# Patient Record
Sex: MALE | Race: WHITE | NOT HISPANIC OR LATINO | URBAN - METROPOLITAN AREA
[De-identification: names, ages, dates, MRNs, and addresses within clinical notes are randomized per-mention and may not be internally consistent; named-entity substitution may affect disease eponyms.]

---

## 2017-04-11 ENCOUNTER — OUTPATIENT (OUTPATIENT)
Dept: OUTPATIENT SERVICES | Facility: HOSPITAL | Age: 57
LOS: 1 days | End: 2017-04-11
Payer: COMMERCIAL

## 2017-04-11 ENCOUNTER — APPOINTMENT (OUTPATIENT)
Dept: ULTRASOUND IMAGING | Facility: IMAGING CENTER | Age: 57
End: 2017-04-11

## 2017-04-11 ENCOUNTER — TRANSCRIPTION ENCOUNTER (OUTPATIENT)
Age: 57
End: 2017-04-11

## 2017-04-11 ENCOUNTER — EMERGENCY (EMERGENCY)
Facility: HOSPITAL | Age: 57
LOS: 1 days | Discharge: HOME | End: 2017-04-11
Attending: EMERGENCY MEDICINE | Admitting: EMERGENCY MEDICINE
Payer: COMMERCIAL

## 2017-04-11 VITALS
HEART RATE: 65 BPM | DIASTOLIC BLOOD PRESSURE: 74 MMHG | OXYGEN SATURATION: 99 % | TEMPERATURE: 99 F | SYSTOLIC BLOOD PRESSURE: 130 MMHG | RESPIRATION RATE: 16 BRPM

## 2017-04-11 VITALS
SYSTOLIC BLOOD PRESSURE: 146 MMHG | DIASTOLIC BLOOD PRESSURE: 94 MMHG | OXYGEN SATURATION: 99 % | HEART RATE: 68 BPM | RESPIRATION RATE: 15 BRPM | TEMPERATURE: 98 F

## 2017-04-11 DIAGNOSIS — Z00.8 ENCOUNTER FOR OTHER GENERAL EXAMINATION: ICD-10-CM

## 2017-04-11 LAB
ALBUMIN SERPL ELPH-MCNC: 4.2 G/DL — SIGNIFICANT CHANGE UP (ref 3.3–5)
ALP SERPL-CCNC: 60 U/L — SIGNIFICANT CHANGE UP (ref 40–120)
ALT FLD-CCNC: 21 U/L — SIGNIFICANT CHANGE UP (ref 4–41)
APTT BLD: 37.9 SEC — HIGH (ref 27.5–37.4)
AST SERPL-CCNC: 20 U/L — SIGNIFICANT CHANGE UP (ref 4–40)
BASOPHILS # BLD AUTO: 0.1 K/UL — SIGNIFICANT CHANGE UP (ref 0–0.2)
BASOPHILS NFR BLD AUTO: 1.3 % — SIGNIFICANT CHANGE UP (ref 0–2)
BILIRUB SERPL-MCNC: 0.5 MG/DL — SIGNIFICANT CHANGE UP (ref 0.2–1.2)
BUN SERPL-MCNC: 14 MG/DL — SIGNIFICANT CHANGE UP (ref 7–23)
CALCIUM SERPL-MCNC: 9.4 MG/DL — SIGNIFICANT CHANGE UP (ref 8.4–10.5)
CHLORIDE SERPL-SCNC: 102 MMOL/L — SIGNIFICANT CHANGE UP (ref 98–107)
CO2 SERPL-SCNC: 28 MMOL/L — SIGNIFICANT CHANGE UP (ref 22–31)
CREAT SERPL-MCNC: 1.1 MG/DL — SIGNIFICANT CHANGE UP (ref 0.5–1.3)
EOSINOPHIL # BLD AUTO: 0.39 K/UL — SIGNIFICANT CHANGE UP (ref 0–0.5)
EOSINOPHIL NFR BLD AUTO: 5 % — SIGNIFICANT CHANGE UP (ref 0–6)
GLUCOSE SERPL-MCNC: 85 MG/DL — SIGNIFICANT CHANGE UP (ref 70–99)
HCT VFR BLD CALC: 46.2 % — SIGNIFICANT CHANGE UP (ref 39–50)
HGB BLD-MCNC: 16 G/DL — SIGNIFICANT CHANGE UP (ref 13–17)
IMM GRANULOCYTES NFR BLD AUTO: 0.1 % — SIGNIFICANT CHANGE UP (ref 0–1.5)
INR BLD: 0.99 — SIGNIFICANT CHANGE UP (ref 0.88–1.17)
LYMPHOCYTES # BLD AUTO: 2.83 K/UL — SIGNIFICANT CHANGE UP (ref 1–3.3)
LYMPHOCYTES # BLD AUTO: 36 % — SIGNIFICANT CHANGE UP (ref 13–44)
MCHC RBC-ENTMCNC: 33 PG — SIGNIFICANT CHANGE UP (ref 27–34)
MCHC RBC-ENTMCNC: 34.6 % — SIGNIFICANT CHANGE UP (ref 32–36)
MCV RBC AUTO: 95.3 FL — SIGNIFICANT CHANGE UP (ref 80–100)
MONOCYTES # BLD AUTO: 0.5 K/UL — SIGNIFICANT CHANGE UP (ref 0–0.9)
MONOCYTES NFR BLD AUTO: 6.4 % — SIGNIFICANT CHANGE UP (ref 2–14)
NEUTROPHILS # BLD AUTO: 4.04 K/UL — SIGNIFICANT CHANGE UP (ref 1.8–7.4)
NEUTROPHILS NFR BLD AUTO: 51.2 % — SIGNIFICANT CHANGE UP (ref 43–77)
PLATELET # BLD AUTO: 206 K/UL — SIGNIFICANT CHANGE UP (ref 150–400)
PMV BLD: 10.9 FL — SIGNIFICANT CHANGE UP (ref 7–13)
POTASSIUM SERPL-MCNC: 4.2 MMOL/L — SIGNIFICANT CHANGE UP (ref 3.5–5.3)
POTASSIUM SERPL-SCNC: 4.2 MMOL/L — SIGNIFICANT CHANGE UP (ref 3.5–5.3)
PROT SERPL-MCNC: 7.6 G/DL — SIGNIFICANT CHANGE UP (ref 6–8.3)
PROTHROM AB SERPL-ACNC: 11.1 SEC — SIGNIFICANT CHANGE UP (ref 9.8–13.1)
RBC # BLD: 4.85 M/UL — SIGNIFICANT CHANGE UP (ref 4.2–5.8)
RBC # FLD: 12.4 % — SIGNIFICANT CHANGE UP (ref 10.3–14.5)
SODIUM SERPL-SCNC: 143 MMOL/L — SIGNIFICANT CHANGE UP (ref 135–145)
WBC # BLD: 7.87 K/UL — SIGNIFICANT CHANGE UP (ref 3.8–10.5)
WBC # FLD AUTO: 7.87 K/UL — SIGNIFICANT CHANGE UP (ref 3.8–10.5)

## 2017-04-11 PROCEDURE — 93971 EXTREMITY STUDY: CPT

## 2017-04-11 PROCEDURE — 99284 EMERGENCY DEPT VISIT MOD MDM: CPT

## 2017-04-11 RX ORDER — MORPHINE SULFATE 50 MG/1
4 CAPSULE, EXTENDED RELEASE ORAL ONCE
Qty: 0 | Refills: 0 | Status: DISCONTINUED | OUTPATIENT
Start: 2017-04-11 | End: 2017-04-11

## 2017-04-11 RX ORDER — FONDAPARINUX SODIUM 2.5 MG/.5ML
1 INJECTION, SOLUTION SUBCUTANEOUS
Qty: 42 | Refills: 0
Start: 2017-04-11 | End: 2017-05-02

## 2017-04-11 RX ORDER — RIVAROXABAN 15 MG-20MG
15 KIT ORAL ONCE
Qty: 0 | Refills: 0 | Status: COMPLETED | OUTPATIENT
Start: 2017-04-11 | End: 2017-04-11

## 2017-04-11 RX ADMIN — MORPHINE SULFATE 4 MILLIGRAM(S): 50 CAPSULE, EXTENDED RELEASE ORAL at 16:13

## 2017-04-11 RX ADMIN — MORPHINE SULFATE 4 MILLIGRAM(S): 50 CAPSULE, EXTENDED RELEASE ORAL at 15:57

## 2017-04-11 RX ADMIN — RIVAROXABAN 15 MILLIGRAM(S): KIT at 17:06

## 2017-04-11 NOTE — ED ADULT TRIAGE NOTE - CHIEF COMPLAINT QUOTE
sent from imaging center for confirmed DVT to left leg. ultrasound completed at imaging center . pt c/o pain behind left knee, x 1 week, progressively feeling worse. no redness or swelling to calf. denies chest pain/sob  PMH- HTN, cholesterol

## 2017-04-11 NOTE — ED PROVIDER NOTE - OBJECTIVE STATEMENT
55 yo M with history of htn presenting with DVT clot diagnosed on ultrasound today. Pt complains of pain x 1 week initially behind the knee, refractory to motrin. Denies recent travel, coagulopathies, surgeries or malignancies. Denies fevers, chills, cough, rhinorrhea, otorrhea, otalgia, nausea, vomiting, constipation, diarrhea, chest pain, shortness of breath or changes in urinary habits.

## 2017-04-11 NOTE — ED PROVIDER NOTE - MEDICAL DECISION MAKING DETAILS
55 yo M with DVT - labs, coags, pain control and likely d/c with PMD followup - discussed with Dr. Gus Cramer - comfortable with starting the patient on xarelto and doctor will continue followup regarding continued AC

## 2017-04-11 NOTE — ED PROVIDER NOTE - ATTENDING CONTRIBUTION TO CARE
55 yo M with 1 wk of pain to calf and post. knee, had US PTA and sent to ED due to blood clot. NAD in ED, clear lungs, soft, NT abd, mild tenderness to calf, equal girth, no ecchymoses, good distal pulses. Likely safe to DC on oral anticoagulant, will discuss with PMD and check insurance coverage.

## 2017-04-11 NOTE — ED ADULT NURSE NOTE - OBJECTIVE STATEMENT
Pt received to intake 10A aaox3 ambulatory sent by radiation services for DVT treatment in L. gastrocnemius vein.  Pt reports pain in L. calf began last night and persisted at 10/10 intermittently throughout the night and this am when [facility].  P/w NAD, breaths even unlabored, vitals as noted, strong peripheral pulses palpated bilaterally.  Meds and labs as ordered 20 g IV at R. AC.  Will reassess.

## 2017-04-11 NOTE — ED PROVIDER NOTE - MUSCULOSKELETAL, MLM
Spine appears normal, range of motion is not limited, no muscle or joint tenderness Spine appears normal, range of motion is not limited. Tenderness in the left calf radiating to lower back

## 2017-04-19 ENCOUNTER — FORM ENCOUNTER (OUTPATIENT)
Age: 57
End: 2017-04-19

## 2017-04-20 ENCOUNTER — APPOINTMENT (OUTPATIENT)
Dept: RADIOLOGY | Facility: HOSPITAL | Age: 57
End: 2017-04-20

## 2017-04-20 ENCOUNTER — APPOINTMENT (OUTPATIENT)
Dept: CARDIOLOGY | Facility: CLINIC | Age: 57
End: 2017-04-20

## 2017-04-20 ENCOUNTER — OUTPATIENT (OUTPATIENT)
Dept: OUTPATIENT SERVICES | Facility: HOSPITAL | Age: 57
LOS: 1 days | End: 2017-04-20
Payer: COMMERCIAL

## 2017-04-20 VITALS
RESPIRATION RATE: 16 BRPM | WEIGHT: 195 LBS | HEIGHT: 69 IN | SYSTOLIC BLOOD PRESSURE: 119 MMHG | HEART RATE: 61 BPM | BODY MASS INDEX: 28.88 KG/M2 | OXYGEN SATURATION: 99 % | DIASTOLIC BLOOD PRESSURE: 76 MMHG

## 2017-04-20 DIAGNOSIS — R06.02 SHORTNESS OF BREATH: ICD-10-CM

## 2017-04-20 PROCEDURE — 71020: CPT | Mod: 26

## 2017-05-04 ENCOUNTER — OUTPATIENT (OUTPATIENT)
Dept: OUTPATIENT SERVICES | Facility: HOSPITAL | Age: 57
LOS: 1 days | End: 2017-05-04
Payer: COMMERCIAL

## 2017-05-04 ENCOUNTER — APPOINTMENT (OUTPATIENT)
Dept: CV DIAGNOSITCS | Facility: HOSPITAL | Age: 57
End: 2017-05-04

## 2017-05-04 DIAGNOSIS — R06.02 SHORTNESS OF BREATH: ICD-10-CM

## 2017-05-04 PROCEDURE — 93306 TTE W/DOPPLER COMPLETE: CPT | Mod: 26

## 2017-07-14 ENCOUNTER — APPOINTMENT (OUTPATIENT)
Dept: CARDIOLOGY | Facility: CLINIC | Age: 57
End: 2017-07-14

## 2017-07-14 ENCOUNTER — APPOINTMENT (OUTPATIENT)
Dept: CV DIAGNOSITCS | Facility: HOSPITAL | Age: 57
End: 2017-07-14

## 2017-07-14 ENCOUNTER — OTHER (OUTPATIENT)
Age: 57
End: 2017-07-14

## 2017-07-14 ENCOUNTER — OUTPATIENT (OUTPATIENT)
Dept: OUTPATIENT SERVICES | Facility: HOSPITAL | Age: 57
LOS: 1 days | End: 2017-07-14

## 2017-07-14 ENCOUNTER — NON-APPOINTMENT (OUTPATIENT)
Age: 57
End: 2017-07-14

## 2017-07-14 VITALS
HEART RATE: 59 BPM | HEIGHT: 69 IN | OXYGEN SATURATION: 98 % | SYSTOLIC BLOOD PRESSURE: 117 MMHG | WEIGHT: 184 LBS | BODY MASS INDEX: 27.25 KG/M2 | DIASTOLIC BLOOD PRESSURE: 77 MMHG

## 2017-07-14 DIAGNOSIS — I82.409 ACUTE EMBOLISM AND THROMBOSIS OF UNSPECIFIED DEEP VEINS OF UNSPECIFIED LOWER EXTREMITY: ICD-10-CM

## 2017-09-10 ENCOUNTER — TRANSCRIPTION ENCOUNTER (OUTPATIENT)
Age: 57
End: 2017-09-10

## 2017-10-26 ENCOUNTER — APPOINTMENT (OUTPATIENT)
Dept: CARDIOLOGY | Facility: CLINIC | Age: 57
End: 2017-10-26
Payer: COMMERCIAL

## 2017-10-26 ENCOUNTER — NON-APPOINTMENT (OUTPATIENT)
Age: 57
End: 2017-10-26

## 2017-10-26 VITALS
WEIGHT: 188 LBS | OXYGEN SATURATION: 98 % | BODY MASS INDEX: 27.85 KG/M2 | HEIGHT: 69 IN | SYSTOLIC BLOOD PRESSURE: 133 MMHG | HEART RATE: 60 BPM | RESPIRATION RATE: 16 BRPM | DIASTOLIC BLOOD PRESSURE: 88 MMHG

## 2017-10-26 DIAGNOSIS — R06.02 SHORTNESS OF BREATH: ICD-10-CM

## 2017-10-26 DIAGNOSIS — Z51.81 ENCOUNTER FOR THERAPEUTIC DRUG LVL MONITORING: ICD-10-CM

## 2017-10-26 DIAGNOSIS — Z71.89 OTHER SPECIFIED COUNSELING: ICD-10-CM

## 2017-10-26 DIAGNOSIS — Z79.01 ENCOUNTER FOR THERAPEUTIC DRUG LVL MONITORING: ICD-10-CM

## 2017-10-26 PROCEDURE — 93000 ELECTROCARDIOGRAM COMPLETE: CPT

## 2017-10-26 PROCEDURE — 99215 OFFICE O/P EST HI 40 MIN: CPT

## 2017-10-26 PROCEDURE — 36415 COLL VENOUS BLD VENIPUNCTURE: CPT

## 2017-10-28 PROBLEM — Z51.81 ANTICOAGULATION MANAGEMENT ENCOUNTER: Status: ACTIVE | Noted: 2017-07-14

## 2017-10-28 PROBLEM — Z71.89 ENCOUNTER FOR ANTICOAGULATION DISCUSSION AND COUNSELING: Status: ACTIVE | Noted: 2017-07-14

## 2017-10-28 PROBLEM — R06.02 SHORTNESS OF BREATH: Status: ACTIVE | Noted: 2017-04-20

## 2017-10-28 LAB — DEPRECATED D DIMER PPP IA-ACNC: <150 NG/ML DDU

## 2017-11-06 ENCOUNTER — OUTPATIENT (OUTPATIENT)
Dept: OUTPATIENT SERVICES | Facility: HOSPITAL | Age: 57
LOS: 1 days | End: 2017-11-06
Payer: COMMERCIAL

## 2017-11-06 ENCOUNTER — APPOINTMENT (OUTPATIENT)
Dept: ULTRASOUND IMAGING | Facility: IMAGING CENTER | Age: 57
End: 2017-11-06
Payer: COMMERCIAL

## 2017-11-06 DIAGNOSIS — Z51.81 ENCOUNTER FOR THERAPEUTIC DRUG LEVEL MONITORING: ICD-10-CM

## 2017-11-06 PROCEDURE — 93970 EXTREMITY STUDY: CPT

## 2017-11-06 PROCEDURE — 93970 EXTREMITY STUDY: CPT | Mod: 26

## 2017-12-18 ENCOUNTER — APPOINTMENT (OUTPATIENT)
Dept: ULTRASOUND IMAGING | Facility: IMAGING CENTER | Age: 57
End: 2017-12-18
Payer: COMMERCIAL

## 2017-12-18 ENCOUNTER — OUTPATIENT (OUTPATIENT)
Dept: OUTPATIENT SERVICES | Facility: HOSPITAL | Age: 57
LOS: 1 days | End: 2017-12-18
Payer: COMMERCIAL

## 2017-12-18 ENCOUNTER — OTHER (OUTPATIENT)
Age: 57
End: 2017-12-18

## 2017-12-18 DIAGNOSIS — I82.409 ACUTE EMBOLISM AND THROMBOSIS OF UNSPECIFIED DEEP VEINS OF UNSPECIFIED LOWER EXTREMITY: ICD-10-CM

## 2017-12-18 PROCEDURE — 93970 EXTREMITY STUDY: CPT | Mod: 26

## 2017-12-18 PROCEDURE — 93970 EXTREMITY STUDY: CPT

## 2019-04-05 ENCOUNTER — TRANSCRIPTION ENCOUNTER (OUTPATIENT)
Age: 59
End: 2019-04-05

## 2019-04-25 ENCOUNTER — APPOINTMENT (OUTPATIENT)
Dept: DERMATOLOGY | Facility: CLINIC | Age: 59
End: 2019-04-25
Payer: COMMERCIAL

## 2019-04-25 VITALS
BODY MASS INDEX: 28.14 KG/M2 | SYSTOLIC BLOOD PRESSURE: 106 MMHG | DIASTOLIC BLOOD PRESSURE: 70 MMHG | HEIGHT: 69 IN | WEIGHT: 190 LBS

## 2019-04-25 DIAGNOSIS — Z87.2 PERSONAL HISTORY OF DISEASES OF THE SKIN AND SUBCUTANEOUS TISSUE: ICD-10-CM

## 2019-04-25 DIAGNOSIS — Z84.0 FAMILY HISTORY OF DISEASES OF THE SKIN AND SUBCUTANEOUS TISSUE: ICD-10-CM

## 2019-04-25 DIAGNOSIS — Z91.89 OTHER SPECIFIED PERSONAL RISK FACTORS, NOT ELSEWHERE CLASSIFIED: ICD-10-CM

## 2019-04-25 PROCEDURE — 11900 INJECT SKIN LESIONS </W 7: CPT | Mod: 59

## 2019-04-25 PROCEDURE — 17110 DESTRUCTION B9 LES UP TO 14: CPT | Mod: 59

## 2019-04-25 PROCEDURE — 99203 OFFICE O/P NEW LOW 30 MIN: CPT | Mod: 25

## 2019-05-17 ENCOUNTER — OUTPATIENT (OUTPATIENT)
Dept: OUTPATIENT SERVICES | Facility: HOSPITAL | Age: 59
LOS: 1 days | End: 2019-05-17
Payer: COMMERCIAL

## 2019-05-17 ENCOUNTER — APPOINTMENT (OUTPATIENT)
Dept: RADIOLOGY | Facility: CLINIC | Age: 59
End: 2019-05-17
Payer: COMMERCIAL

## 2019-05-17 DIAGNOSIS — Z00.8 ENCOUNTER FOR OTHER GENERAL EXAMINATION: ICD-10-CM

## 2019-05-17 PROCEDURE — 73030 X-RAY EXAM OF SHOULDER: CPT | Mod: 26,LT

## 2019-05-17 PROCEDURE — 73000 X-RAY EXAM OF COLLAR BONE: CPT

## 2019-05-17 PROCEDURE — 73000 X-RAY EXAM OF COLLAR BONE: CPT | Mod: 26,LT

## 2019-05-17 PROCEDURE — 73030 X-RAY EXAM OF SHOULDER: CPT

## 2019-05-23 ENCOUNTER — APPOINTMENT (OUTPATIENT)
Dept: ULTRASOUND IMAGING | Facility: CLINIC | Age: 59
End: 2019-05-23
Payer: COMMERCIAL

## 2019-05-23 ENCOUNTER — OUTPATIENT (OUTPATIENT)
Dept: OUTPATIENT SERVICES | Facility: HOSPITAL | Age: 59
LOS: 1 days | End: 2019-05-23
Payer: COMMERCIAL

## 2019-05-23 DIAGNOSIS — Z00.8 ENCOUNTER FOR OTHER GENERAL EXAMINATION: ICD-10-CM

## 2019-05-23 PROCEDURE — 93880 EXTRACRANIAL BILAT STUDY: CPT | Mod: 26

## 2019-05-23 PROCEDURE — 93880 EXTRACRANIAL BILAT STUDY: CPT

## 2019-06-20 ENCOUNTER — APPOINTMENT (OUTPATIENT)
Dept: DERMATOLOGY | Facility: CLINIC | Age: 59
End: 2019-06-20
Payer: COMMERCIAL

## 2019-06-20 PROCEDURE — 99213 OFFICE O/P EST LOW 20 MIN: CPT | Mod: 25

## 2019-06-20 PROCEDURE — 17110 DESTRUCTION B9 LES UP TO 14: CPT

## 2020-01-06 ENCOUNTER — EMERGENCY (EMERGENCY)
Facility: HOSPITAL | Age: 60
LOS: 1 days | Discharge: HOME | End: 2020-01-06
Attending: EMERGENCY MEDICINE | Admitting: EMERGENCY MEDICINE
Payer: COMMERCIAL

## 2020-01-06 VITALS
TEMPERATURE: 98 F | SYSTOLIC BLOOD PRESSURE: 120 MMHG | HEART RATE: 85 BPM | RESPIRATION RATE: 16 BRPM | DIASTOLIC BLOOD PRESSURE: 72 MMHG | OXYGEN SATURATION: 99 %

## 2020-01-06 VITALS
OXYGEN SATURATION: 99 % | HEIGHT: 69 IN | WEIGHT: 184.97 LBS | RESPIRATION RATE: 18 BRPM | TEMPERATURE: 99 F | HEART RATE: 79 BPM | DIASTOLIC BLOOD PRESSURE: 74 MMHG | SYSTOLIC BLOOD PRESSURE: 119 MMHG

## 2020-01-06 DIAGNOSIS — Z98.890 OTHER SPECIFIED POSTPROCEDURAL STATES: Chronic | ICD-10-CM

## 2020-01-06 PROCEDURE — 99284 EMERGENCY DEPT VISIT MOD MDM: CPT

## 2020-01-06 PROCEDURE — 71046 X-RAY EXAM CHEST 2 VIEWS: CPT | Mod: 26

## 2020-01-06 PROCEDURE — 94640 AIRWAY INHALATION TREATMENT: CPT

## 2020-01-06 PROCEDURE — 99284 EMERGENCY DEPT VISIT MOD MDM: CPT | Mod: 25

## 2020-01-06 PROCEDURE — 71046 X-RAY EXAM CHEST 2 VIEWS: CPT

## 2020-01-06 RX ORDER — DEXTROMETHORPHAN HYDROBROMIDE AND PROMETHAZINE HYDROCHLORIDE 15; 6.25 MG/5ML; MG/5ML
5 SYRUP ORAL
Qty: 100 | Refills: 0
Start: 2020-01-06 | End: 2020-01-10

## 2020-01-06 RX ORDER — IPRATROPIUM/ALBUTEROL SULFATE 18-103MCG
3 AEROSOL WITH ADAPTER (GRAM) INHALATION ONCE
Refills: 0 | Status: COMPLETED | OUTPATIENT
Start: 2020-01-06 | End: 2020-01-06

## 2020-01-06 RX ORDER — ALBUTEROL 90 UG/1
2 AEROSOL, METERED ORAL
Qty: 1 | Refills: 0
Start: 2020-01-06 | End: 2020-02-04

## 2020-01-06 RX ADMIN — Medication 3 MILLILITER(S): at 22:49

## 2020-01-06 RX ADMIN — Medication 3 MILLILITER(S): at 23:07

## 2020-01-06 RX ADMIN — Medication 60 MILLIGRAM(S): at 22:49

## 2020-01-06 NOTE — ED PROVIDER NOTE - PROGRESS NOTE DETAILS
pt reevaluted, feeling better, coughing has improved, pt to be d/c home with inhaler, steroids and cough meds, follow up with pmd, return if any symtpoms worsen

## 2020-01-06 NOTE — ED PROVIDER NOTE - PATIENT PORTAL LINK FT
You can access the FollowMyHealth Patient Portal offered by [facility] by registering at the following website: [email or web address]. By joining [employer]’s FollowMyHealth portal, you will also be able to view your health information using other applications (apps) compatible with our system.

## 2020-01-06 NOTE — ED ADULT NURSE NOTE - PMH
DVT (deep venous thrombosis)    GERD (gastroesophageal reflux disease)    No pertinent past medical history

## 2020-01-06 NOTE — ED PROVIDER NOTE - OBJECTIVE STATEMENT
58yo male who presents with cough for 4 days. pt c/o cough 4 days ago, resolving on its own and then pt went to work today and started to cough, persistent, no fever, chills, no sob, pt used a ventolin inhaler with some relief.

## 2020-03-09 ENCOUNTER — APPOINTMENT (OUTPATIENT)
Dept: RADIOLOGY | Facility: CLINIC | Age: 60
End: 2020-03-09
Payer: COMMERCIAL

## 2020-03-09 ENCOUNTER — OUTPATIENT (OUTPATIENT)
Dept: OUTPATIENT SERVICES | Facility: HOSPITAL | Age: 60
LOS: 1 days | End: 2020-03-09
Payer: COMMERCIAL

## 2020-03-09 DIAGNOSIS — Z98.890 OTHER SPECIFIED POSTPROCEDURAL STATES: Chronic | ICD-10-CM

## 2020-03-09 DIAGNOSIS — Z00.8 ENCOUNTER FOR OTHER GENERAL EXAMINATION: ICD-10-CM

## 2020-03-09 PROBLEM — K21.9 GASTRO-ESOPHAGEAL REFLUX DISEASE WITHOUT ESOPHAGITIS: Chronic | Status: ACTIVE | Noted: 2020-01-06

## 2020-03-09 PROBLEM — I82.409 ACUTE EMBOLISM AND THROMBOSIS OF UNSPECIFIED DEEP VEINS OF UNSPECIFIED LOWER EXTREMITY: Chronic | Status: ACTIVE | Noted: 2020-01-06

## 2020-03-09 PROCEDURE — 71046 X-RAY EXAM CHEST 2 VIEWS: CPT

## 2020-03-09 PROCEDURE — 71046 X-RAY EXAM CHEST 2 VIEWS: CPT | Mod: 26

## 2020-07-13 ENCOUNTER — APPOINTMENT (OUTPATIENT)
Dept: DERMATOLOGY | Facility: CLINIC | Age: 60
End: 2020-07-13
Payer: COMMERCIAL

## 2020-07-13 VITALS — BODY MASS INDEX: 28.14 KG/M2 | HEIGHT: 69 IN | WEIGHT: 190 LBS

## 2020-07-13 DIAGNOSIS — L82.0 INFLAMED SEBORRHEIC KERATOSIS: ICD-10-CM

## 2020-07-13 PROCEDURE — 17110 DESTRUCTION B9 LES UP TO 14: CPT | Mod: 59

## 2020-07-13 PROCEDURE — 11900 INJECT SKIN LESIONS </W 7: CPT | Mod: 59

## 2020-07-13 PROCEDURE — 99213 OFFICE O/P EST LOW 20 MIN: CPT | Mod: 25

## 2021-06-21 ENCOUNTER — NON-APPOINTMENT (OUTPATIENT)
Age: 61
End: 2021-06-21

## 2021-07-27 ENCOUNTER — APPOINTMENT (OUTPATIENT)
Dept: DERMATOLOGY | Facility: CLINIC | Age: 61
End: 2021-07-27
Payer: COMMERCIAL

## 2021-07-27 PROCEDURE — 11900 INJECT SKIN LESIONS </W 7: CPT

## 2021-07-27 PROCEDURE — 99213 OFFICE O/P EST LOW 20 MIN: CPT | Mod: 25

## 2021-07-27 PROCEDURE — 99072 ADDL SUPL MATRL&STAF TM PHE: CPT

## 2021-07-27 NOTE — HISTORY OF PRESENT ILLNESS
[FreeTextEntry1] : f/u psoriasis and ILK [de-identified] : 60  year old male here for f/u psoriasis and ILK.\par 1. Psoriasis- much improved s/p ILK. Very Happy with results. Would like to continue treatments. Areas are not itchy that have been treated. flaring s/p PO steroids.

## 2021-07-27 NOTE — PHYSICAL EXAM
[FreeTextEntry3] : AAOx3, pleasant, NAD, no visual lymphadenopathy\par hair, scalp, face, nose, eyelids, ears, lips, oropharynx, neck, chest, abdomen, back, right arm, left arm, nails, and hands examined with all normal findings,\par pertinent findings include:\par \par right  and left elbow with erythematous plaque and right hand with plaque\par penis with scaling at corona\par mild xerosis

## 2021-09-22 ENCOUNTER — APPOINTMENT (OUTPATIENT)
Dept: SURGERY | Facility: CLINIC | Age: 61
End: 2021-09-22
Payer: COMMERCIAL

## 2021-09-22 VITALS
HEART RATE: 60 BPM | TEMPERATURE: 97.4 F | OXYGEN SATURATION: 99 % | DIASTOLIC BLOOD PRESSURE: 81 MMHG | SYSTOLIC BLOOD PRESSURE: 138 MMHG | RESPIRATION RATE: 16 BRPM | BODY MASS INDEX: 28.14 KG/M2 | WEIGHT: 190 LBS | HEIGHT: 69 IN

## 2021-09-22 DIAGNOSIS — R10.32 LEFT LOWER QUADRANT PAIN: ICD-10-CM

## 2021-09-22 PROCEDURE — 99203 OFFICE O/P NEW LOW 30 MIN: CPT

## 2021-09-24 NOTE — PHYSICAL EXAM
[Respiratory Effort] : normal respiratory effort [Alert] : alert [Oriented to Person] : oriented to person [Oriented to Place] : oriented to place [Oriented to Time] : oriented to time [Calm] : calm [JVD] : no jugular venous distention  [Abdominal Masses] : No abdominal masses [Abdomen Tenderness] : ~T ~M No abdominal tenderness [de-identified] : SHAQ CROWLEY NAD [de-identified] : EOMI [de-identified] : soft NT ND no evidence of inguinal hernias + tenderness laterally just below the ASIS on the left.

## 2021-09-24 NOTE — HISTORY OF PRESENT ILLNESS
[de-identified] : 61 yo male with h/o LIH repair 33 years ago presents for evaluation of left groin pain which he states first began 1 month ago after he moved out his stove for repair. He states he then aggravated the are while pulling roots out of his garden. He denies any lump. He states an area of pin point tenderness. Denies any antiinflammatory use for pain.

## 2021-09-24 NOTE — ASSESSMENT
[FreeTextEntry1] : 59 yo male with strain of left groin. Instructed to try a course of antiinflammatories for 2-3 weeks. Possible f/u with ortho if symptoms don’t improve.

## 2022-10-15 ENCOUNTER — EMERGENCY (EMERGENCY)
Facility: HOSPITAL | Age: 62
LOS: 1 days | Discharge: HOME | End: 2022-10-15
Attending: EMERGENCY MEDICINE | Admitting: EMERGENCY MEDICINE
Payer: COMMERCIAL

## 2022-10-15 VITALS
TEMPERATURE: 98 F | HEART RATE: 944 BPM | HEIGHT: 69 IN | SYSTOLIC BLOOD PRESSURE: 170 MMHG | WEIGHT: 190.04 LBS | RESPIRATION RATE: 18 BRPM | OXYGEN SATURATION: 98 % | DIASTOLIC BLOOD PRESSURE: 100 MMHG

## 2022-10-15 VITALS
OXYGEN SATURATION: 96 % | TEMPERATURE: 98 F | RESPIRATION RATE: 20 BRPM | SYSTOLIC BLOOD PRESSURE: 152 MMHG | DIASTOLIC BLOOD PRESSURE: 74 MMHG | HEART RATE: 90 BPM

## 2022-10-15 DIAGNOSIS — Z98.890 OTHER SPECIFIED POSTPROCEDURAL STATES: Chronic | ICD-10-CM

## 2022-10-15 LAB
ALBUMIN SERPL ELPH-MCNC: 3.8 G/DL — SIGNIFICANT CHANGE UP (ref 3.3–5)
ALP SERPL-CCNC: 72 U/L — SIGNIFICANT CHANGE UP (ref 30–120)
ALT FLD-CCNC: 32 U/L DA — SIGNIFICANT CHANGE UP (ref 10–60)
ANION GAP SERPL CALC-SCNC: 8 MMOL/L — SIGNIFICANT CHANGE UP (ref 5–17)
APTT BLD: 33.4 SEC — SIGNIFICANT CHANGE UP (ref 27.5–35.5)
AST SERPL-CCNC: 26 U/L — SIGNIFICANT CHANGE UP (ref 10–40)
BASOPHILS # BLD AUTO: 0.12 K/UL — SIGNIFICANT CHANGE UP (ref 0–0.2)
BASOPHILS NFR BLD AUTO: 1.1 % — SIGNIFICANT CHANGE UP (ref 0–2)
BILIRUB SERPL-MCNC: 0.6 MG/DL — SIGNIFICANT CHANGE UP (ref 0.2–1.2)
BUN SERPL-MCNC: 16 MG/DL — SIGNIFICANT CHANGE UP (ref 7–23)
CALCIUM SERPL-MCNC: 9.2 MG/DL — SIGNIFICANT CHANGE UP (ref 8.4–10.5)
CHLORIDE SERPL-SCNC: 105 MMOL/L — SIGNIFICANT CHANGE UP (ref 96–108)
CO2 SERPL-SCNC: 31 MMOL/L — SIGNIFICANT CHANGE UP (ref 22–31)
CREAT SERPL-MCNC: 1.25 MG/DL — SIGNIFICANT CHANGE UP (ref 0.5–1.3)
EGFR: 66 ML/MIN/1.73M2 — SIGNIFICANT CHANGE UP
EOSINOPHIL # BLD AUTO: 0.37 K/UL — SIGNIFICANT CHANGE UP (ref 0–0.5)
EOSINOPHIL NFR BLD AUTO: 3.5 % — SIGNIFICANT CHANGE UP (ref 0–6)
GLUCOSE SERPL-MCNC: 96 MG/DL — SIGNIFICANT CHANGE UP (ref 70–99)
HCT VFR BLD CALC: 51.7 % — HIGH (ref 39–50)
HGB BLD-MCNC: 17.9 G/DL — HIGH (ref 13–17)
IMM GRANULOCYTES NFR BLD AUTO: 0.5 % — SIGNIFICANT CHANGE UP (ref 0–0.9)
INR BLD: 1.12 RATIO — SIGNIFICANT CHANGE UP (ref 0.88–1.16)
LYMPHOCYTES # BLD AUTO: 2.15 K/UL — SIGNIFICANT CHANGE UP (ref 1–3.3)
LYMPHOCYTES # BLD AUTO: 20.1 % — SIGNIFICANT CHANGE UP (ref 13–44)
MCHC RBC-ENTMCNC: 32.7 PG — SIGNIFICANT CHANGE UP (ref 27–34)
MCHC RBC-ENTMCNC: 34.6 GM/DL — SIGNIFICANT CHANGE UP (ref 32–36)
MCV RBC AUTO: 94.3 FL — SIGNIFICANT CHANGE UP (ref 80–100)
MONOCYTES # BLD AUTO: 0.86 K/UL — SIGNIFICANT CHANGE UP (ref 0–0.9)
MONOCYTES NFR BLD AUTO: 8.1 % — SIGNIFICANT CHANGE UP (ref 2–14)
NEUTROPHILS # BLD AUTO: 7.12 K/UL — SIGNIFICANT CHANGE UP (ref 1.8–7.4)
NEUTROPHILS NFR BLD AUTO: 66.7 % — SIGNIFICANT CHANGE UP (ref 43–77)
NRBC # BLD: 0 /100 WBCS — SIGNIFICANT CHANGE UP (ref 0–0)
PLATELET # BLD AUTO: 268 K/UL — SIGNIFICANT CHANGE UP (ref 150–400)
POTASSIUM SERPL-MCNC: 4.1 MMOL/L — SIGNIFICANT CHANGE UP (ref 3.5–5.3)
POTASSIUM SERPL-SCNC: 4.1 MMOL/L — SIGNIFICANT CHANGE UP (ref 3.5–5.3)
PROT SERPL-MCNC: 7.9 G/DL — SIGNIFICANT CHANGE UP (ref 6–8.3)
PROTHROM AB SERPL-ACNC: 12.9 SEC — SIGNIFICANT CHANGE UP (ref 10.5–13.4)
RBC # BLD: 5.48 M/UL — SIGNIFICANT CHANGE UP (ref 4.2–5.8)
RBC # FLD: 11.9 % — SIGNIFICANT CHANGE UP (ref 10.3–14.5)
SODIUM SERPL-SCNC: 144 MMOL/L — SIGNIFICANT CHANGE UP (ref 135–145)
WBC # BLD: 10.67 K/UL — HIGH (ref 3.8–10.5)
WBC # FLD AUTO: 10.67 K/UL — HIGH (ref 3.8–10.5)

## 2022-10-15 PROCEDURE — 36000 PLACE NEEDLE IN VEIN: CPT | Mod: XU

## 2022-10-15 PROCEDURE — 85610 PROTHROMBIN TIME: CPT

## 2022-10-15 PROCEDURE — 85025 COMPLETE CBC W/AUTO DIFF WBC: CPT

## 2022-10-15 PROCEDURE — 93970 EXTREMITY STUDY: CPT

## 2022-10-15 PROCEDURE — 36415 COLL VENOUS BLD VENIPUNCTURE: CPT

## 2022-10-15 PROCEDURE — 93970 EXTREMITY STUDY: CPT | Mod: 26

## 2022-10-15 PROCEDURE — 99284 EMERGENCY DEPT VISIT MOD MDM: CPT | Mod: 25

## 2022-10-15 PROCEDURE — 99284 EMERGENCY DEPT VISIT MOD MDM: CPT

## 2022-10-15 PROCEDURE — 80053 COMPREHEN METABOLIC PANEL: CPT

## 2022-10-15 PROCEDURE — 85730 THROMBOPLASTIN TIME PARTIAL: CPT

## 2022-10-15 RX ORDER — ROSUVASTATIN CALCIUM 5 MG/1
0 TABLET ORAL
Qty: 0 | Refills: 0 | DISCHARGE

## 2022-10-15 RX ORDER — OXYCODONE AND ACETAMINOPHEN 5; 325 MG/1; MG/1
1 TABLET ORAL ONCE
Refills: 0 | Status: DISCONTINUED | OUTPATIENT
Start: 2022-10-15 | End: 2022-10-15

## 2022-10-15 RX ORDER — ASPIRIN/CALCIUM CARB/MAGNESIUM 324 MG
1 TABLET ORAL
Qty: 0 | Refills: 0 | DISCHARGE

## 2022-10-15 RX ORDER — FAMOTIDINE 10 MG/ML
0 INJECTION INTRAVENOUS
Qty: 0 | Refills: 0 | DISCHARGE

## 2022-10-15 RX ADMIN — OXYCODONE AND ACETAMINOPHEN 1 TABLET(S): 5; 325 TABLET ORAL at 19:45

## 2022-10-15 RX ADMIN — OXYCODONE AND ACETAMINOPHEN 1 TABLET(S): 5; 325 TABLET ORAL at 18:50

## 2022-10-15 NOTE — ED PROVIDER NOTE - CARE PROVIDER_API CALL
YOUR VASCULAR SURGEON,   Phone: (   )    -  Fax: (   )    -  Follow Up Time: 1-3 Days    Dontrell Sinha)  Surgery Vascular  [address] [place] [address]  Phone: [phone]  Fax: [phone]  Follow Up Time: 1-3 Days

## 2022-10-15 NOTE — ED ADULT NURSE NOTE - OBJECTIVE STATEMENT
60yo male walked into ED, pt c/o right lower leg/calf pain. pt advised staff he suffers from DVT in the past.

## 2022-10-15 NOTE — ED ADULT NURSE NOTE - NSIMPLEMENTINTERV_GEN_ALL_ED
Implemented All Universal Safety Interventions:  [place] to call system. Call bell, personal items and telephone within reach. Instruct patient to call for assistance. Room bathroom lighting operational. Non-slip footwear when patient is off stretcher. Physically safe environment: no spills, clutter or unnecessary equipment. Stretcher in lowest position, wheels locked, appropriate side rails in place.

## 2022-10-15 NOTE — ED PROVIDER NOTE - PATIENT PORTAL LINK FT
You can access the FollowMyHealth Patient Portal offered by [facility] by registering at the following website: [email or web address]. By joining [employer]’s FollowMyHealth portal, you will also be able to view your health information using other applications (apps) compatible with our system.

## 2022-10-15 NOTE — ED PROVIDER NOTE - OBJECTIVE STATEMENT
61-year-old male with history of hyperlipidemia, GERD, DVT presents with complaint of right calf pain today.  Patient states that he has history of unprovoked DVT in his right gastrocnemius in 2017, was on Xarelto for 1 year after.  Patient currently on aspirin 81 mg.  Patient states that he has had mild pain and bruising to his right calf for the past few weeks with exertion however today he felt severe pain in the right calf radiating to right thigh and swelling.  Denies chest pain, shortness of breath, palpitations, fever, trauma or fall, numbness, tingling. 61-year-old male with history of hyperlipidemia, GERD, DVT presents with complaint of right calf pain today.  Patient states that he has history of unprovoked DVT in his right gastrocnemius in 2017, was on Xarelto for 1 year after.  Patient currently on aspirin 81 mg.  Patient states that he has had mild pain and bruising to his right calf for the past few weeks with exertion however today he felt severe pain in the right calf radiating to right thigh and swelling.  Denies chest pain, shortness of breath, palpitations, fever, trauma or fall, numbness, tingling.  vascular: Dr. Db Damian

## 2022-10-15 NOTE — ED ADULT NURSE NOTE - NS ED NURSE DISCH DISPOSITION
Please let her know that I reviewed blood sugars and overall these look better  I would have her continue to eliminate sugared drinks (soda, juice, sweet tea etc) from her diet, avoid junk food (chips, crackers, cookies, sweets, cereal etc)  Please have her send an update in 1-2 weeks  Thanks, vickie    Discharged

## 2022-10-15 NOTE — ED PROVIDER NOTE - CLINICAL SUMMARY MEDICAL DECISION MAKING FREE TEXT BOX
62 yo M with PMHx of GERD, DVT, h/o abd surgery presents to ED c/o R leg pain. Pt believes he is having another DVT on his R leg. Hx of DVT was also on the same leg. Reports cramping, bruising. Pt is currently taking 81mg of ASA. Pt was on Xarelto for his first DVT, but was taken off 2018. Takes famotidine for GERD. Nonsmoker. Exam: VSS, afebrile, NAD. Extremities: R leg with minor swelling posterior calf, area of tenderness and induration consistent with possible DVT, FROM, pulses and sensations intact. Remainder of exam unremarkable. Posterior calf pain. Plan: r/o DVT, US doppler, may need labs and AC.

## 2022-10-15 NOTE — ED PROVIDER NOTE - PROVIDER TOKENS
FREE:[LAST:[YOUR VASCULAR SURGEON],PHONE:[(   )    -],FAX:[(   )    -],FOLLOWUP:[1-3 Days]],PROVIDER:[TOKEN:[[id number]],FOLLOWUP:[1-3 Days]]

## 2022-10-15 NOTE — ED PROVIDER NOTE - MUSCULOSKELETAL, MLM
+ttp right calf with mild prominent varicose veins in calf with mild swelling noted, toes warm & mobile, distal pulses and sensation intact, NVI +ttp right calf with mild prominent varicose veins in calf with mild swelling noted, toes warm & mobile, FROM RLE, distal pulses and sensation intact, NVI; LLE NT with FROM

## 2022-10-15 NOTE — ED PROVIDER NOTE - PROGRESS NOTE DETAILS
Reevaluated patient at bedside.  Patient feeling much improved.  Discussed the results of all diagnostic testing in ED and copies of all reports given. Advised to f/u with his vascular surgeon, rest, elevate, nsaids for pain, strict return precautions. An opportunity to ask questions was given.  Discussed the importance of prompt, close medical follow-up.  Patient will return with any changes, concerns or persistent / worsening symptoms.  Understanding of all instructions verbalized.

## 2022-10-15 NOTE — ED PROVIDER NOTE - NSICDXPASTMEDICALHX_GEN_ALL_CORE_FT
PAST MEDICAL HISTORY:  DVT (deep venous thrombosis)     GERD (gastroesophageal reflux disease)     No pertinent past medical history

## 2022-10-15 NOTE — ED PROVIDER NOTE - NSFOLLOWUPINSTRUCTIONS_ED_ALL_ED_FT
Follow-up with your vascular surgeon for reevaluation, ongoing care and treatment.  Rest, elevate leg, take Motrin as directed for pain.  If any worsening of symptoms, chest pain, shortness of breath or other related symptoms, return to the ER immediately.    Leg Cramps      Leg cramps occur when one or more muscles tighten and a person has no control over it (involuntary muscle contraction). Muscle cramps are most common in the calf muscles of the leg. They can occur during exercise or at rest. Leg cramps are painful, and they may last for a few seconds to a few minutes. Cramps may return several times before they finally stop.    Usually, leg cramps are not caused by a serious medical problem. In many cases, the cause is not known. Some common causes include:  •Excessive physical effort (overexertion), such as during intense exercise.      •Doing the same motion over and over.      •Staying in a certain position for a long period of time.      •Improper preparation, form, or technique while doing a sport or an activity.      •Dehydration.      •Injury.      •Side effects of certain medicines.    •Abnormally low levels of minerals in your blood (electrolytes), especially potassium and calcium. This could result from:  •Pregnancy.      •Taking diuretic medicines.          Follow these instructions at home:    Eating and drinking     •Drink enough fluid to keep your urine pale yellow. Staying hydrated may help prevent cramps.      •Eat a healthy diet that includes plenty of nutrients to help your muscles function. A healthy diet includes fruits and vegetables, lean protein, whole grains, and low-fat or nonfat dairy products.        Managing pain, stiffness, and swelling                   •Try massaging, stretching, and relaxing the affected muscle. Do this for several minutes at a time.    •If directed, put ice on areas that are sore or painful after a cramp. To do this:  •Put ice in a plastic bag.      •Place a towel between your skin and the bag.      •Leave the ice on for 20 minutes, 2–3 times a day.      •Remove the ice if your skin turns bright red. This is very important. If you cannot feel pain, heat, or cold, you have a greater risk of damage to the area.      •If directed, apply heat to muscles that are tense or tight. Do this before you exercise, or as often as told by your health care provider. Use the heat source that your health care provider recommends, such as a moist heat pack or a heating pad. To do this:  •Place a towel between your skin and the heat source.      •Leave the heat on for 20–30 minutes.       •Remove the heat if your skin turns bright red. This is especially important if you are unable to feel pain, heat, or cold. You may have a greater risk of getting burned.        •Try taking hot showers or baths to help relax tight muscles.      General instructions     •If you are having frequent leg cramps, avoid intense exercise for several days.      •Take over-the-counter and prescription medicines only as told by your health care provider.      •Keep all follow-up visits. This is important.        Contact a health care provider if:    •Your leg cramps get more severe or more frequent, or they do not improve over time.      •Your foot becomes cold, numb, or blue.        Summary    •Muscle cramps can develop in any muscle, but the most common place is in the calf muscles of the leg.      •Leg cramps are painful, and they may last for a few seconds to a few minutes.      •Usually, leg cramps are not caused by a serious medical problem. Often, the cause is not known.      •Stay hydrated, and take over-the-counter and prescription medicines only as told by your health care provider.      This information is not intended to replace advice given to you by your health care provider. Make sure you discuss any questions you have with your health care provider.

## 2022-10-17 ENCOUNTER — APPOINTMENT (OUTPATIENT)
Dept: ULTRASOUND IMAGING | Facility: CLINIC | Age: 62
End: 2022-10-17

## 2022-10-17 PROCEDURE — 93971 EXTREMITY STUDY: CPT | Mod: RT

## 2022-10-26 DIAGNOSIS — K21.9 GASTRO-ESOPHAGEAL REFLUX DISEASE W/OUT ESOPHAGITIS: ICD-10-CM

## 2022-10-26 RX ORDER — RIVAROXABAN 20 MG/1
20 TABLET, FILM COATED ORAL
Qty: 90 | Refills: 3 | Status: DISCONTINUED | COMMUNITY
Start: 2017-04-20 | End: 2022-10-26

## 2022-10-26 RX ORDER — BETAMETHASONE DIPROPIONATE 0.5 MG/G
0.05 OINTMENT TOPICAL TWICE DAILY
Qty: 2 | Refills: 3 | Status: DISCONTINUED | COMMUNITY
Start: 2020-07-13 | End: 2022-10-26

## 2022-10-26 RX ORDER — ROSUVASTATIN CALCIUM 10 MG/1
10 TABLET, FILM COATED ORAL DAILY
Refills: 0 | Status: ACTIVE | COMMUNITY

## 2022-10-26 RX ORDER — ASPIRIN ENTERIC COATED TABLETS 81 MG 81 MG/1
81 TABLET, DELAYED RELEASE ORAL DAILY
Refills: 0 | Status: ACTIVE | COMMUNITY

## 2022-10-26 RX ORDER — CHLORPHENIRAMINE MALEATE 4 MG/1
4 TABLET ORAL DAILY
Refills: 0 | Status: ACTIVE | COMMUNITY

## 2022-10-27 ENCOUNTER — NON-APPOINTMENT (OUTPATIENT)
Age: 62
End: 2022-10-27

## 2022-10-27 ENCOUNTER — APPOINTMENT (OUTPATIENT)
Dept: CARDIOLOGY | Facility: CLINIC | Age: 62
End: 2022-10-27

## 2022-10-27 ENCOUNTER — OUTPATIENT (OUTPATIENT)
Dept: OUTPATIENT SERVICES | Facility: HOSPITAL | Age: 62
LOS: 1 days | End: 2022-10-27

## 2022-10-27 ENCOUNTER — APPOINTMENT (OUTPATIENT)
Dept: CV DIAGNOSITCS | Facility: HOSPITAL | Age: 62
End: 2022-10-27

## 2022-10-27 VITALS
WEIGHT: 190 LBS | BODY MASS INDEX: 28.14 KG/M2 | SYSTOLIC BLOOD PRESSURE: 149 MMHG | DIASTOLIC BLOOD PRESSURE: 93 MMHG | HEART RATE: 66 BPM | OXYGEN SATURATION: 99 % | HEIGHT: 69 IN

## 2022-10-27 DIAGNOSIS — M79.606 PAIN IN LEG, UNSPECIFIED: ICD-10-CM

## 2022-10-27 DIAGNOSIS — Z13.6 ENCOUNTER FOR SCREENING FOR CARDIOVASCULAR DISORDERS: ICD-10-CM

## 2022-10-27 DIAGNOSIS — Z98.890 OTHER SPECIFIED POSTPROCEDURAL STATES: Chronic | ICD-10-CM

## 2022-10-27 DIAGNOSIS — I82.409 ACUTE EMBOLISM AND THROMBOSIS OF UNSPECIFIED DEEP VEINS OF UNSPECIFIED LOWER EXTREMITY: ICD-10-CM

## 2022-10-27 PROCEDURE — 93000 ELECTROCARDIOGRAM COMPLETE: CPT

## 2022-10-27 PROCEDURE — 99204 OFFICE O/P NEW MOD 45 MIN: CPT

## 2022-10-27 PROCEDURE — 93923 UPR/LXTR ART STDY 3+ LVLS: CPT | Mod: 26

## 2022-10-27 PROCEDURE — 93925 LOWER EXTREMITY STUDY: CPT | Mod: 26

## 2023-02-09 ENCOUNTER — APPOINTMENT (OUTPATIENT)
Dept: DERMATOLOGY | Facility: CLINIC | Age: 63
End: 2023-02-09
Payer: COMMERCIAL

## 2023-02-09 DIAGNOSIS — L29.9 PRURITUS, UNSPECIFIED: ICD-10-CM

## 2023-02-09 DIAGNOSIS — L40.9 PSORIASIS, UNSPECIFIED: ICD-10-CM

## 2023-02-09 DIAGNOSIS — L30.9 DERMATITIS, UNSPECIFIED: ICD-10-CM

## 2023-02-09 PROCEDURE — 99214 OFFICE O/P EST MOD 30 MIN: CPT | Mod: 25

## 2023-02-09 PROCEDURE — 11900 INJECT SKIN LESIONS </W 7: CPT

## 2023-02-09 RX ORDER — TACROLIMUS 1 MG/G
0.1 OINTMENT TOPICAL
Qty: 1 | Refills: 4 | Status: ACTIVE | COMMUNITY
Start: 2023-02-09 | End: 1900-01-01

## 2023-02-09 RX ORDER — HALOBETASOL PROPIONATE 0.5 MG/G
0.05 OINTMENT TOPICAL
Qty: 1 | Refills: 3 | Status: ACTIVE | COMMUNITY
Start: 2019-04-25 | End: 1900-01-01

## 2023-02-09 NOTE — PHYSICAL EXAM
[FreeTextEntry3] : AAOx3, pleasant, NAD, no visual lymphadenopathy\par hair, scalp, face, nose, eyelids, ears, lips, oropharynx, neck, chest, abdomen, back, right arm, left arm, nails, and hands examined with all normal findings,\par pertinent findings include:\par \par right  elbow with erythematous plaque \par mild xerosis\par face with some atrophy

## 2023-02-09 NOTE — HISTORY OF PRESENT ILLNESS
[FreeTextEntry1] : f/u psoriasis and ILK [de-identified] : 62  year old male here for f/u psoriasis and ILK.\par 1. Psoriasis- much improved s/p ILK. Very Happy with results. Would like to continue treatments. Areas are not itchy that have been treated. \par has been using halobetasol for 2 years daily. rebounds when tries to stop.

## 2023-05-03 ENCOUNTER — NON-APPOINTMENT (OUTPATIENT)
Age: 63
End: 2023-05-03

## 2023-05-04 ENCOUNTER — NON-APPOINTMENT (OUTPATIENT)
Age: 63
End: 2023-05-04

## 2023-05-04 ENCOUNTER — APPOINTMENT (OUTPATIENT)
Dept: ORTHOPEDIC SURGERY | Facility: CLINIC | Age: 63
End: 2023-05-04
Payer: COMMERCIAL

## 2023-05-04 VITALS — WEIGHT: 200 LBS | HEIGHT: 69 IN | BODY MASS INDEX: 29.62 KG/M2

## 2023-05-04 DIAGNOSIS — M25.519 PAIN IN UNSPECIFIED SHOULDER: ICD-10-CM

## 2023-05-04 PROCEDURE — 73030 X-RAY EXAM OF SHOULDER: CPT | Mod: LT

## 2023-05-04 PROCEDURE — 99203 OFFICE O/P NEW LOW 30 MIN: CPT

## 2023-05-04 PROCEDURE — 72040 X-RAY EXAM NECK SPINE 2-3 VW: CPT

## 2023-05-06 ENCOUNTER — OUTPATIENT (OUTPATIENT)
Dept: OUTPATIENT SERVICES | Facility: HOSPITAL | Age: 63
LOS: 1 days | End: 2023-05-06
Payer: COMMERCIAL

## 2023-05-06 ENCOUNTER — APPOINTMENT (OUTPATIENT)
Dept: MRI IMAGING | Facility: CLINIC | Age: 63
End: 2023-05-06
Payer: COMMERCIAL

## 2023-05-06 DIAGNOSIS — M54.12 RADICULOPATHY, CERVICAL REGION: ICD-10-CM

## 2023-05-06 DIAGNOSIS — Z98.890 OTHER SPECIFIED POSTPROCEDURAL STATES: Chronic | ICD-10-CM

## 2023-05-06 PROCEDURE — 72141 MRI NECK SPINE W/O DYE: CPT

## 2023-05-06 PROCEDURE — 72141 MRI NECK SPINE W/O DYE: CPT | Mod: 26

## 2023-05-15 ENCOUNTER — APPOINTMENT (OUTPATIENT)
Dept: ORTHOPEDIC SURGERY | Facility: CLINIC | Age: 63
End: 2023-05-15
Payer: COMMERCIAL

## 2023-05-15 DIAGNOSIS — M54.12 RADICULOPATHY, CERVICAL REGION: ICD-10-CM

## 2023-05-15 PROCEDURE — 99441: CPT

## 2023-05-15 RX ORDER — CYCLOBENZAPRINE HYDROCHLORIDE 5 MG/1
5 TABLET, FILM COATED ORAL
Qty: 30 | Refills: 0 | Status: ACTIVE | COMMUNITY
Start: 2023-05-04 | End: 1900-01-01

## 2023-05-15 NOTE — DISCUSSION/SUMMARY
[de-identified] : 62-year-old male with 6 weeks left upper extremity pain and radiculopathy with paresthesias.  He was told by another provider that he may have thoracic outlet syndrome although his symptoms are more consistent with cervical radiculopathy with possible herniated disc.  Suggested we obtain a cervical MRI for further evaluation.  If this is normal we may consider shoulder or chest MRI to evaluate clavicle.  He was given a referral for physical therapy and a prescription for Flexeril was sent to his pharmacy.  Side effects of medication reviewed.  We will discuss MRI results over the phone when available.

## 2023-05-15 NOTE — PHYSICAL EXAM
[de-identified] : General: No acute distress\par Mental: Alert and oriented x3\par Eyes: Conjunctivitis not seen\par Chest: Symmetric chest rise, no audible wheezing\par Skin: Bilateral lower extremities absent from rashes and ulcers\par Abdomen: No distention\par \par Left shoulder:\par Inspection: No swelling, ecchymosis or gross deformity.\par Skin: No masses, No lesions\par Tenderness: No bicipital tenderness, no tenderness to the greater tuberosity/RTC insertion, no anterior shoulder/lesser tuberosity tenderness. No tenderness SC joint, clavicle, AC joint.\par ROM: 160/50/L1\par Impingement tests: Negative Rubalcava, negative Kendall, negative drop arm, paresthesias with arm extension\par AC Joint: Mild pain with cross arm testing\par Biceps: Negative speed\par Strength: 5/5 forward flexion, abduction, external rotation, and internal rotation\par Neuro: AIN, PIN, Ulnar nerve motor intact\par Sensation: Decreased at the ulnar distribution, intact to light touch in radial, median, and axillary nerve distributions\par Vasc: 2+ radial pulse\par \par Cervical spine: Nontender midline, nontender paraspinal muscles, positive Spurling sign\par 5/5 strength throughout the bilateral upper extremities\par 1/2 sensation at left C7, otherwise intact to light touch bilaterally [de-identified] : 3 views of the left shoulder show neutral alignment, well-maintained joint space, humeral head centered on the glenoid, no fractures or dislocations.\par \par AP and lateral of the cervical spine show a lordotic alignment with slight narrowing of the disc space, unable to visualize below C5.\par \par Chest x-ray shows clavicle sternoclavicular joints normal, no cardiopulmonary process identified.

## 2023-05-15 NOTE — HISTORY OF PRESENT ILLNESS
[de-identified] : 62-year-old male, right-hand-dominant, presents with moderate left shoulder pain over the past 6 weeks, no associated injury.  The pain has been intermittent involving the superior aspect of the shoulder and a sensation of popping of his clavicle with motion.  He believes he had an issue in the past with his clavicle popping in 2019.  He experiences intermittent paresthesias of the left upper extremity, mostly in the small and ring finger, and some weakness with spasms.  He also reports difficulty using his hand and problems with coordination.  The pain is increased with lying down and improves with shoulder and clavicle manipulation.  He has been taking anti-inflammatory medication.  He previously saw a physical therapist and chiropractor, and was told he may have thoracic outlet syndrome.\par He works as an  in a [employer].  His spouse works for the cardiothoracic department.\par Past medical history includes DVT in 2017 and previously on Xarelto.

## 2023-05-16 PROBLEM — M54.12 LEFT CERVICAL RADICULOPATHY: Status: ACTIVE | Noted: 2023-05-04

## 2023-05-17 ENCOUNTER — APPOINTMENT (OUTPATIENT)
Dept: ORTHOPEDIC SURGERY | Facility: CLINIC | Age: 63
End: 2023-05-17
Payer: COMMERCIAL

## 2023-05-17 VITALS
BODY MASS INDEX: 29.62 KG/M2 | HEIGHT: 69 IN | SYSTOLIC BLOOD PRESSURE: 154 MMHG | WEIGHT: 200 LBS | DIASTOLIC BLOOD PRESSURE: 109 MMHG | HEART RATE: 94 BPM

## 2023-05-17 DIAGNOSIS — M50.20 OTHER CERVICAL DISC DISPLACEMENT, UNSPECIFIED CERVICAL REGION: ICD-10-CM

## 2023-05-17 DIAGNOSIS — M50.30 OTHER CERVICAL DISC DEGENERATION, UNSPECIFIED CERVICAL REGION: ICD-10-CM

## 2023-05-17 PROCEDURE — 99215 OFFICE O/P EST HI 40 MIN: CPT

## 2023-05-17 RX ORDER — IBUPROFEN 800 MG/1
800 TABLET, FILM COATED ORAL 3 TIMES DAILY
Qty: 90 | Refills: 1 | Status: ACTIVE | COMMUNITY
Start: 2023-05-17 | End: 1900-01-01

## 2023-05-17 RX ORDER — CYCLOBENZAPRINE HYDROCHLORIDE 5 MG/1
5 TABLET, FILM COATED ORAL
Qty: 90 | Refills: 1 | Status: ACTIVE | COMMUNITY
Start: 2023-05-17 | End: 1900-01-01

## 2023-05-17 NOTE — DISCUSSION/SUMMARY
[de-identified] : 61 yo male with C7T1 HNP stenosis/HNP, recommend PT, NSAIDS and LILIBETH, recommend conservative care, RTO 6 weeks. \par \par Diagnosis, prognosis, natural history and treatment was discussed with patient. Patient was advised if the following symptoms develop: chills, fever, \par loss of bladder control, bowel incontinence or urinary retention, numbness/tingling or weakness is present in upper or lower extremities, to go to the nearest emergency room. This may be a new clinical condition not present at the time of the patient visit  that may lead to paralysis and/or death, Patient advised if the above symptoms developed to also call the office immediately to inform us and to go to the nearest emergency room.\par \par

## 2023-05-17 NOTE — PHYSICAL EXAM
[NL] : Motor strength of the right upper extremities is normal [Motor Strength Upper Extremities] : left (weak) [] : Sensory: [C8-LT] : C8 [ALL] : dorsalis pedis, posterior tibial, femoral, popliteal, and radial 2+ and symmetric bilaterally [de-identified] : NTTP C spine and b/l paracervicals.  \par \par Skin intact C spine. No rashes, ulcers, blisters.  \par \par No lymphedema.  \par \par Rapid alternating movements- intact \par \par Finger to nose testing- intact \par \par Full and non-painful ROM RUE, LUE, RLE and LLE. Skin intact RUE, LUE, RLE and LLE. No rashes, blisters, ulcers. NTTP RUE, LUE, RLE and LLE. No evidence of dislocation or subluxation B/L.  \par \par Spurlings Left side\par \par \par  [de-identified] : EXAM: [id number] - MR SPINE CERVICAL - ORDERED BY: LILY PARKS\par \par \par PROCEDURE DATE: 05/06/2023\par \par \par \par INTERPRETATION: EXAMINATION:MR CERVICAL SPINE\par \par CLINICAL INDICATION:Pain and right upper extremity radiculopathy.\par \par TECHNIQUE: Multi-planar, multi-sequence MR of the cervical spine was performed without intravenous contrast.\par \par COMPARISON: Radiographs in [facility] dated 5/4/2023\par \par INTERPRETATION:\par \par BONES AND BONE MARROW: There is no evidence of acute fracture. There are multilevel endplate fatty discogenic changes throughout the cervical spine.\par INTERVERTEBRAL DISCS: There is disc desiccation with loss of disc height throughout the cervical spine, mild at C2-3 and C3-4 and moderate/severe throughout the remainder of the cervical spine. Additional findings as detailed below.\par ALIGNMENT: The craniocervical junction and atlantoaxial intervals are maintained. The spinal alignment is maintained.\par SPINAL CORD: The spinal cord is normal in size and signal intensity.\par EXTRASPINAL: There is no prevertebral soft tissue swelling. There is no epidural mass or fluid collection. The paraspinal and included extraspinal soft tissues are unremarkable.\par \par Evaluation of individual disc space levels demonstrates the following:\par \par C2-C3: Mild uncovertebral hypertrophy with significant central canal or neural foraminal stenosis.\par C3-C4: Broad-based posterior disc osteophyte complex slightly eccentric to the left with moderate left and mild right neural foraminal stenosis. Partial effacement of the ventral CSF without central stenosis.\par C4-C5: Broad based central posterior disc osteophyte complex with moderate central stenosis and moderate bilateral neural foraminal stenosis.\par C5-C6: Mild posterior disc osteophyte complex with mild uncovertebral hypertrophy on the left. Mild left neural foraminal stenosis. No right neural foraminal stenosis. Mild central stenosis.\par C6-C7: Broad-based posterior disc osteophyte complex eccentric to the right with moderate right and mild left neural foraminal stenosis. Disc material abuts the left hemicord with mild central stenosis.\par C7-T1: Posterior disc osteophyte complex with left foraminal disc herniation. Severe left and mild right neural foraminal stenosis. Mild central stenosis.\par \par IMPRESSION:\par Multilevel cervical spondylosis with canal and foraminal stenosis as detailed above. Findings are notable for severe left neural foraminal stenosis at C7-T1 owing to a left foraminal disc herniation, central stenosis at several levels and moderate central stenosis at C4-5.\par

## 2023-05-17 NOTE — HISTORY OF PRESENT ILLNESS
[Worsening] : worsening [3] : a current pain level of 3/10 [10] : a maximum pain level of 10/10 [de-identified] : 62 year old male presents today with left arm pain x 6 weeks. No injury reported. He suddenly developed left arm pain was evaluated by Dr. Lopez, x-ray was done shoulder pathology was ruled out. Patient has obtained MRI. He states that pain has gotten worse in the past 48 hours its constant. He has lost dexterity in the fingers, has numbness and tingling in the left forearm. Flexeril is helpful.

## 2023-07-07 ENCOUNTER — APPOINTMENT (OUTPATIENT)
Dept: ORTHOPEDIC SURGERY | Facility: CLINIC | Age: 63
End: 2023-07-07
Payer: COMMERCIAL

## 2023-07-07 VITALS — BODY MASS INDEX: 29.62 KG/M2 | HEIGHT: 69 IN | WEIGHT: 200 LBS

## 2023-07-07 DIAGNOSIS — M50.123 CERVICAL DISC DISORDER AT C6-C7 LEVEL WITH RADICULOPATHY: ICD-10-CM

## 2023-07-07 PROCEDURE — 99214 OFFICE O/P EST MOD 30 MIN: CPT

## 2023-07-07 RX ORDER — CYCLOBENZAPRINE HYDROCHLORIDE 5 MG/1
5 TABLET, FILM COATED ORAL
Qty: 90 | Refills: 1 | Status: ACTIVE | COMMUNITY
Start: 2023-07-07 | End: 1900-01-01

## 2023-08-09 ENCOUNTER — TRANSCRIPTION ENCOUNTER (OUTPATIENT)
Age: 63
End: 2023-08-09

## 2023-08-17 ENCOUNTER — TRANSCRIPTION ENCOUNTER (OUTPATIENT)
Age: 63
End: 2023-08-17

## 2023-12-04 ENCOUNTER — APPOINTMENT (OUTPATIENT)
Dept: ULTRASOUND IMAGING | Facility: CLINIC | Age: 63
End: 2023-12-04
Payer: COMMERCIAL

## 2023-12-04 ENCOUNTER — OUTPATIENT (OUTPATIENT)
Dept: OUTPATIENT SERVICES | Facility: HOSPITAL | Age: 63
LOS: 1 days | End: 2023-12-04
Payer: COMMERCIAL

## 2023-12-04 DIAGNOSIS — Z98.890 OTHER SPECIFIED POSTPROCEDURAL STATES: Chronic | ICD-10-CM

## 2023-12-04 DIAGNOSIS — Z00.8 ENCOUNTER FOR OTHER GENERAL EXAMINATION: ICD-10-CM

## 2023-12-04 PROCEDURE — 93971 EXTREMITY STUDY: CPT

## 2023-12-04 PROCEDURE — 93971 EXTREMITY STUDY: CPT | Mod: 26,RT

## 2024-01-16 NOTE — ED ADULT NURSE NOTE - NSIMPLEMENTINTERV_GEN_ALL_ED
show
Implemented All Universal Safety Interventions:  [place] to call system. Call bell, personal items and telephone within reach. Instruct patient to call for assistance. Room bathroom lighting operational. Non-slip footwear when patient is off stretcher. Physically safe environment: no spills, clutter or unnecessary equipment. Stretcher in lowest position, wheels locked, appropriate side rails in place.

## 2024-04-30 ENCOUNTER — APPOINTMENT (OUTPATIENT)
Dept: CT IMAGING | Facility: CLINIC | Age: 64
End: 2024-04-30
Payer: COMMERCIAL

## 2024-04-30 ENCOUNTER — OUTPATIENT (OUTPATIENT)
Dept: OUTPATIENT SERVICES | Facility: HOSPITAL | Age: 64
LOS: 1 days | End: 2024-04-30
Payer: COMMERCIAL

## 2024-04-30 DIAGNOSIS — Z98.890 OTHER SPECIFIED POSTPROCEDURAL STATES: Chronic | ICD-10-CM

## 2024-04-30 DIAGNOSIS — Z00.8 ENCOUNTER FOR OTHER GENERAL EXAMINATION: ICD-10-CM

## 2024-04-30 PROCEDURE — 75571 CT HRT W/O DYE W/CA TEST: CPT

## 2024-04-30 PROCEDURE — 75571 CT HRT W/O DYE W/CA TEST: CPT | Mod: 26

## 2024-07-19 ENCOUNTER — NON-APPOINTMENT (OUTPATIENT)
Age: 64
End: 2024-07-19

## 2024-07-22 ENCOUNTER — APPOINTMENT (OUTPATIENT)
Dept: PULMONOLOGY | Facility: CLINIC | Age: 64
End: 2024-07-22
Payer: COMMERCIAL

## 2024-07-22 DIAGNOSIS — G47.33 OBSTRUCTIVE SLEEP APNEA (ADULT) (PEDIATRIC): ICD-10-CM

## 2024-07-22 PROCEDURE — G2211 COMPLEX E/M VISIT ADD ON: CPT

## 2024-07-22 PROCEDURE — 99214 OFFICE O/P EST MOD 30 MIN: CPT

## 2024-07-22 NOTE — PHYSICAL EXAM
[No Acute Distress] : no acute distress [Well Nourished] : well nourished [Well Developed] : well developed [No Resp Distress] : no resp distress [No Acc Muscle Use] : no acc muscle use [No Focal Deficits] : no focal deficits

## 2024-07-22 NOTE — HISTORY OF PRESENT ILLNESS
[TextBox_4] : This visit was provided via telehealth using real-time 2-way audio visual technology. The patient,  KALEN BECK , was located at home, [address] [place] [address] at the time of the visit.  The provider, Aracelis Naik, was located at office at the time of the visit.  The patient, Mr. KALEN BECK and Physician MD Naik, Aracelis participated in the telehealth encounter.  Verbal consent obtained by  from patient   KALEN BECK is a 63 year old male who presents with + watchpat results PMH: HTN, HLD, never smoker seen cardio who ordered hst wife reports snoring denies apneas no headahes or dry mouth trying to lose weight reports some dream recall

## 2024-09-06 ENCOUNTER — APPOINTMENT (OUTPATIENT)
Dept: PULMONOLOGY | Facility: CLINIC | Age: 64
End: 2024-09-06
Payer: COMMERCIAL

## 2024-09-06 VITALS — HEART RATE: 74 BPM | OXYGEN SATURATION: 98 % | DIASTOLIC BLOOD PRESSURE: 76 MMHG | SYSTOLIC BLOOD PRESSURE: 122 MMHG

## 2024-09-06 DIAGNOSIS — G47.33 OBSTRUCTIVE SLEEP APNEA (ADULT) (PEDIATRIC): ICD-10-CM

## 2024-09-06 PROCEDURE — 94010 BREATHING CAPACITY TEST: CPT

## 2024-09-06 PROCEDURE — 99214 OFFICE O/P EST MOD 30 MIN: CPT | Mod: 25

## 2024-09-06 PROCEDURE — 94727 GAS DIL/WSHOT DETER LNG VOL: CPT

## 2024-09-06 PROCEDURE — 94729 DIFFUSING CAPACITY: CPT

## 2024-09-06 PROCEDURE — ZZZZZ: CPT

## 2024-09-06 PROCEDURE — 71046 X-RAY EXAM CHEST 2 VIEWS: CPT

## 2024-09-06 NOTE — PHYSICAL EXAM
[No Acute Distress] : no acute distress [Well Nourished] : well nourished [Well Developed] : well developed [No Resp Distress] : no resp distress [No Acc Muscle Use] : no acc muscle use [No Focal Deficits] : no focal deficits [Low Lying Soft Palate] : low lying soft palate [III] : Mallampati Class: III [Clear to Auscultation Bilaterally] : clear to auscultation bilaterally [Oriented x3] : oriented x3

## 2024-09-08 NOTE — PROCEDURE
[FreeTextEntry1] : 2024 PA and lateral chest xray performed using standard projections. Bones and soft tissues structures are unremarkable.Cardiac silhouette appears normal. Lung fields are clear. No infiltrate or masses noted. Mediastinal contours are normal. IMPRESSION: no acute cardiopulmonary disease  2024 normal pfts   compliance data reviewed : 2024-2024 % days used :  74 % avg use > 4 hours: 74% avg use: 8 hrs &57 min avg pressure 8 cmh20 avg AHI 1 minimal leak.   previous data reviewed: HST 10/hr       EXAM: [id number] - CT HEART CALCIUM SCORE - ORDERED BY: BRADY SANCHEZ   PROCEDURE DATE: 2024    INTERPRETATION: CT HEART WITHOUT CONTRAST (CT CALCIUM SCORE)  INDICATION: Cardiovascular risk assessment. Episodes of tachycardia.  COMPARISON: None.  TECHNIQUE: Unenhanced prospective images were obtained of the heart. Images were obtained using a Siemens Somatom Force.  FINDINGS:  CALCIUM SCORE: The calculated Agatston score is 335.  Agatston Score: Left main: 0. Left anterior descendin. Left circumflex: 39. Right coronary: 29.  Calcium volume (cubic millimeters): Left main: 0. Left anterior descendin. Left circumflex: 35. Right coronary: 30.   0: No plaque is present. 1-10: A minimal amount of plaque is present. : A mild amount of plaque is present. 101-400: A moderate amount of plaque is present. Greater than 400: A large amount of plaque is present.  Heart: The heart is normal in size. No pericardial effusion  NONCARDIAC FINDINGS: Hepatic steatosis. Right anterior rib sclerotic focus, likely a bone island. Right upper lobe calcified granuloma.    IMPRESSION: Calculated Agatston score of 335, which is at percentile 84 for subjects of the same age, gender, and race/ethnicity who are free of clinical cardiovascular disease and treated diabetes ([facility] database).  --- End of Report ---       MARLENY DOAN M.D., Attending Radiologist This document has been electronically signed. May 1 2024 9:50AM

## 2024-09-08 NOTE — HISTORY OF PRESENT ILLNESS
[Never] : never [TextBox_4] : KALEN BECK is a 63 year old male who presents for first cpap f/u   PMH: HTN, HLD, never smoker   watchpat diagnosed with moses  here for visit in person  on n20 nasal medium mask  feels better no snoring better rested less caffeine in take toelrating pressure/ mask/air

## 2024-09-08 NOTE — PROCEDURE
[FreeTextEntry1] : 2024 PA and lateral chest xray performed using standard projections. Bones and soft tissues structures are unremarkable.Cardiac silhouette appears normal. Lung fields are clear. No infiltrate or masses noted. Mediastinal contours are normal. IMPRESSION: no acute cardiopulmonary disease  2024 normal pfts   compliance data reviewed : 2024-2024 % days used :  74 % avg use > 4 hours: 74% avg use: 8 hrs &57 min avg pressure 8 cmh20 avg AHI 1 minimal leak.   previous data reviewed: HST 10/hr       EXAM: [id number] - CT HEART CALCIUM SCORE - ORDERED BY: BRADY SANCHEZ   PROCEDURE DATE: 2024    INTERPRETATION: CT HEART WITHOUT CONTRAST (CT CALCIUM SCORE)  INDICATION: Cardiovascular risk assessment. Episodes of tachycardia.  COMPARISON: None.  TECHNIQUE: Unenhanced prospective images were obtained of the heart. Images were obtained using a Siemens Somatom Force.  FINDINGS:  CALCIUM SCORE: The calculated Agatston score is 335.  Agatston Score: Left main: 0. Left anterior descendin. Left circumflex: 39. Right coronary: 29.  Calcium volume (cubic millimeters): Left main: 0. Left anterior descendin. Left circumflex: 35. Right coronary: 30.   0: No plaque is present. 1-10: A minimal amount of plaque is present. : A mild amount of plaque is present. 101-400: A moderate amount of plaque is present. Greater than 400: A large amount of plaque is present.  Heart: The heart is normal in size. No pericardial effusion  NONCARDIAC FINDINGS: Hepatic steatosis. Right anterior rib sclerotic focus, likely a bone island. Right upper lobe calcified granuloma.    IMPRESSION: Calculated Agatston score of 335, which is at percentile 84 for subjects of the same age, gender, and race/ethnicity who are free of clinical cardiovascular disease and treated diabetes ([facility] database).  --- End of Report ---       MARLENY DOAN M.D., Attending Radiologist This document has been electronically signed. May 1 2024 9:50AM

## 2024-11-25 ENCOUNTER — TRANSCRIPTION ENCOUNTER (OUTPATIENT)
Age: 64
End: 2024-11-25

## 2024-12-16 ENCOUNTER — APPOINTMENT (OUTPATIENT)
Dept: PULMONOLOGY | Facility: CLINIC | Age: 64
End: 2024-12-16
Payer: COMMERCIAL

## 2024-12-16 DIAGNOSIS — G47.33 OBSTRUCTIVE SLEEP APNEA (ADULT) (PEDIATRIC): ICD-10-CM

## 2024-12-16 PROCEDURE — 99214 OFFICE O/P EST MOD 30 MIN: CPT

## 2025-04-21 ENCOUNTER — APPOINTMENT (OUTPATIENT)
Dept: PULMONOLOGY | Facility: CLINIC | Age: 65
End: 2025-04-21
Payer: COMMERCIAL

## 2025-04-21 DIAGNOSIS — G47.33 OBSTRUCTIVE SLEEP APNEA (ADULT) (PEDIATRIC): ICD-10-CM

## 2025-04-21 PROCEDURE — 99214 OFFICE O/P EST MOD 30 MIN: CPT | Mod: 95

## 2025-04-21 RX ORDER — EMPAGLIFLOZIN 10 MG/1
10 TABLET, FILM COATED ORAL
Refills: 0 | Status: ACTIVE | COMMUNITY

## 2025-09-12 ENCOUNTER — APPOINTMENT (OUTPATIENT)
Dept: MRI IMAGING | Facility: CLINIC | Age: 65
End: 2025-09-12

## 2025-09-12 PROCEDURE — 70551 MRI BRAIN STEM W/O DYE: CPT | Mod: 26
